# Patient Record
Sex: MALE | Race: WHITE | Employment: FULL TIME | URBAN - METROPOLITAN AREA
[De-identification: names, ages, dates, MRNs, and addresses within clinical notes are randomized per-mention and may not be internally consistent; named-entity substitution may affect disease eponyms.]

---

## 2022-07-24 NOTE — PROGRESS NOTES
HISTORY OF PRESENT ILLNESS  Lissa Zurita is a 59 y.o. male is a new patient . 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 Additionally he is had loss of energy and weight gain. He does have occasional urgency to get the bathroom in time. His PSA has remained low. His thyroid level has been recorded as normal   He reports problems with erectile dysfunction for several month. He reports of not being able to  maintain an erection most of the time. He never tried cialis or Viagra, or testosterone injections  He is on a fluid pill, every other day,has urgency,incomplete emptying( rates at 3) but is not voiding much. He reports that if he is not on a fluid pill he voids 5-6 per day. 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 His IPSS is 9,    He is on CpAP mashine, no history of DM, elevated BMI 48.82           Past Medical History:  PMHx (including negatives):  has a past medical history of Cancer ([address] Hypertension, Morbid obesity ([address] Other ill-defined conditions(799.89), Unspecified adverse effect of anesthesia, and Unspecified sleep apnea. PSurgHx:  has a past surgical history that includes hx orthopaedic (01/01/1987); hx appendectomy (01/01/1989); hx heent; hx urological; and hx colonoscopy. PSocHx:  reports that he has quit smoking. His smoking use included cigarettes. He has never used smokeless tobacco. He reports current alcohol use of about 15.0 standard drinks per week. He reports that he does not currently use drugs after having used the following drugs: Marijuana.        Acute Diagnoses Addressed Today       Benign prostatic hyperplasia with lower urinary tract symptoms, symptom details unspecified    -  Primary        Relevant Medications        testosterone (ANDROGEL) 20.25 mg/1.25 gram (1.62 %) gel        Other Relevant Orders        AMB POC PVR, ROBYN,POST-VOID RES,US,NON-IMAGING    Erectile dysfunction, unspecified erectile dysfunction type            Relevant Medications        testosterone (ANDROGEL) 20.25 mg/1.25 gram (1.62 %) gel        Other Relevant Orders        AMB POC PVR, ROBYN,POST-VOID RES,US,NON-IMAGING    Prostatitis, unspecified prostatitis type                Review of Systems   Constitutional:  Positive for malaise/fatigue. HENT: Negative. Eyes: Negative. Respiratory: On CPAP    Cardiovascular:  Positive for leg swelling. Genitourinary: Negative. Musculoskeletal: Negative. Skin: Negative. Neurological: Negative. Psychiatric/Behavioral: Negative. Patient denies the symptoms of COVID-19 per routine screening guidelines. Home Medications    Medication Sig Start Date End Date Taking? Authorizing Provider   allopurinoL (ZYLOPRIM) 100 mg tablet Take  by mouth daily. Yes Provider, Historical   escitalopram oxalate (LEXAPRO) 20 mg tablet Take 20 mg by mouth in the morning. Yes Provider, Historical   buPROPion XL (WELLBUTRIN XL) 300 mg XL tablet Take 300 mg by mouth in the morning. Yes Provider, Historical   multivitamin (ONE A DAY) tablet Take 1 Tablet by mouth in the morning. Yes Provider, Historical   tadalafiL (CIALIS) 20 mg tablet Take 1 Tablet by mouth as needed for Erectile Dysfunction. 7/25/22  Yes Abby Lerma NP   ciprofloxacin HCl (CIPRO) 500 mg tablet Take 1 Tablet by mouth two (2) times a day for 30 days. 7/25/22 8/24/22 Yes Abby Lerma NP   testosterone (ANDROGEL) 20.25 mg/1.25 gram (1.62 %) gel Apply 1 pump daily  on your upper arm. Rotate sites 7/25/22  Yes Abby Lerma NP   colchicine 0.5 mg Tab Take  by mouth. Yes Provider, Historical   indomethacin (INDOCIN) 25 mg capsule Take 25 mg by mouth three (3) times daily. Yes Provider, Historical   torsemide (DEMADEX) 10 mg tablet Take 10 mg by mouth daily.    Yes Provider, Historical oxyCODONE-acetaminophen (PERCOCET) 5-325 mg per tablet Take 1 Tab by mouth every four (4) hours as needed for Pain. 6/7/13 7/25/22  Jj Baptiste MD   promethazine (PHENERGAN) 25 mg tablet Take 1 Tab by mouth every six (6) hours as needed for Nausea. 6/7/13 7/25/22  Jj Baptiste MD   aspirin (ASPIRIN) 325 mg tablet Take 1 Tab by mouth daily. 6/7/13 7/25/22  Jj Baptiste MD   omega-3 fatty acids-vitamin e 1,000 mg cap Take 1 Cap by mouth daily. 7/25/22  Provider, Historical   PV W-O PEE/FERROUS FUMARATE/FA (M-VIT PO) Take  by mouth daily. 7/25/22  Provider, Historical      Physical Exam  Vitals and nursing note reviewed. Constitutional:       Appearance: Normal appearance. He is obese. HENT:      Nose: Nose normal.      Mouth/Throat:      Mouth: Mucous membranes are moist.   Eyes:      Pupils: Pupils are equal, round, and reactive to light. Cardiovascular:      Rate and Rhythm: Normal rate and regular rhythm. Pulmonary:      Effort: Pulmonary effort is normal.   Abdominal:      General: Abdomen is flat. Palpations: Abdomen is soft. Genitourinary:     Penis: Normal.       Testes: Normal.      Comments: Prostate small but tender in the middle  Musculoskeletal:         General: Normal range of motion. Cervical back: Normal range of motion. Skin:     General: Skin is warm. Neurological:      General: No focal deficit present. Mental Status: He is alert and oriented to person, place, and time. Psychiatric:         Mood and Affect: Mood normal.         Behavior: Behavior normal.         Thought Content: Thought content normal.         Judgment: Judgment normal.       ASSESSMENT and PLAN  Diagnoses and all orders for this visit:    1. Benign prostatic hyperplasia with lower urinary tract symptoms, symptom details unspecified  -     AMB POC PVR, ROBYN,POST-VOID RES,US,NON-IMAGING  -     testosterone (ANDROGEL) 20.25 mg/1.25 gram (1.62 %) gel; Apply 1 pump daily  on your upper arm. Rotate sites    2. Erectile dysfunction, unspecified erectile dysfunction type  -     AMB POC PVR, ROBYN,POST-VOID RES,US,NON-IMAGING  -     testosterone (ANDROGEL) 20.25 mg/1.25 gram (1.62 %) gel; Apply 1 pump daily  on your upper arm. Rotate sites    3. Prostatitis, unspecified prostatitis type    Other orders  -     tadalafiL (CIALIS) 20 mg tablet; Take 1 Tablet by mouth as needed for Erectile Dysfunction. -     ciprofloxacin HCl (CIPRO) 500 mg tablet; Take 1 Tablet by mouth two (2) times a day for 30 days. Discussed sids effects of ciprofloxacin and Cialis. Patient received education on prostatitis management ( warm tab, gel seat,alive as needed for pain)          Gene Younger may have a reminder for a \"due or due soon\" health maintenance. The patient has been encouraged to contact their primary care provider for follow-up on this health maintenance or other necessary and/or routine health screening.

## 2022-07-25 ENCOUNTER — OFFICE VISIT (OUTPATIENT)
Dept: UROLOGY | Age: 57
End: 2022-07-25
Payer: COMMERCIAL

## 2022-07-25 VITALS
SYSTOLIC BLOOD PRESSURE: 152 MMHG | DIASTOLIC BLOOD PRESSURE: 88 MMHG | HEART RATE: 72 BPM | HEIGHT: 73 IN | WEIGHT: 315 LBS | BODY MASS INDEX: 41.75 KG/M2

## 2022-07-25 DIAGNOSIS — N40.1 BENIGN PROSTATIC HYPERPLASIA WITH LOWER URINARY TRACT SYMPTOMS, SYMPTOM DETAILS UNSPECIFIED: Primary | ICD-10-CM

## 2022-07-25 DIAGNOSIS — N41.9 PROSTATITIS, UNSPECIFIED PROSTATITIS TYPE: ICD-10-CM

## 2022-07-25 DIAGNOSIS — E29.1 HYPOGONADISM IN MALE: ICD-10-CM

## 2022-07-25 DIAGNOSIS — N52.9 ERECTILE DYSFUNCTION, UNSPECIFIED ERECTILE DYSFUNCTION TYPE: ICD-10-CM

## 2022-07-25 PROCEDURE — 99204 OFFICE O/P NEW MOD 45 MIN: CPT | Performed by: UROLOGY

## 2022-07-25 RX ORDER — TESTOSTERONE 20.25 MG/1.25G
GEL TOPICAL
Qty: 20.25 EACH | Refills: 2 | Status: SHIPPED | OUTPATIENT
Start: 2022-07-25

## 2022-07-25 RX ORDER — CIPROFLOXACIN 500 MG/1
500 TABLET ORAL 2 TIMES DAILY
Qty: 60 TABLET | Refills: 0 | Status: SHIPPED | OUTPATIENT
Start: 2022-07-25 | End: 2022-08-24

## 2022-07-25 RX ORDER — ESCITALOPRAM OXALATE 20 MG/1
20 TABLET ORAL DAILY
COMMUNITY

## 2022-07-25 RX ORDER — BISMUTH SUBSALICYLATE 262 MG
1 TABLET,CHEWABLE ORAL DAILY
COMMUNITY

## 2022-07-25 RX ORDER — TADALAFIL 20 MG/1
20 TABLET ORAL AS NEEDED
Qty: 30 TABLET | Refills: 1 | Status: SHIPPED | OUTPATIENT
Start: 2022-07-25

## 2022-07-25 RX ORDER — ALLOPURINOL 100 MG/1
TABLET ORAL DAILY
COMMUNITY

## 2022-07-25 RX ORDER — BUPROPION HYDROCHLORIDE 300 MG/1
300 TABLET ORAL DAILY
COMMUNITY

## 2022-07-25 NOTE — PROGRESS NOTES
Chief Complaint   Patient presents with    New Patient    Benign Prostatic Hypertrophy    Hypogonadism         1. Have you been to the ER, urgent care clinic since your last visit? Hospitalized since your last visit? Yes [facility]     2. Have you seen or consulted any other health care providers outside of the [address] [place] [address] since your last visit? Include any pap smears or colon screening.  Yes Where: pt dosent remember where      Visit Vitals  BP (!) 152/88 (BP 1 Location: Right upper arm, BP Patient Position: Sitting, BP Cuff Size: Large adult)   Pulse 72   Ht 6' 1\" (1.854 m)   Wt (!) 370 lb (167.8 kg)   BMI 48.82 kg/m²

## 2022-07-26 ENCOUNTER — TELEPHONE (OUTPATIENT)
Dept: UROLOGY | Age: 57
End: 2022-07-26

## 2022-07-26 NOTE — TELEPHONE ENCOUNTER
Patient called stating [facility] pharmacy says his CIRPO and Testosterone needs prior auth    Pls advise, he would like a call back

## 2022-07-27 NOTE — TELEPHONE ENCOUNTER
Please call patient back and let him know your plan below. He was suppose to get call back yesterday but was not called back. In note from 7/26 it says would work on 7/27.  Please call patient today to give him update

## 2022-07-28 NOTE — TELEPHONE ENCOUNTER
I spoke with patient, found labs to support prescription prior auth thru covermymeds. Im just awaiting determination and then I will followup with patient.

## 2022-07-28 NOTE — TELEPHONE ENCOUNTER
I spoke to wife and let her know that [facility] or Annelise Kate needs to prescribe it to [facility] because they can no longer use 30 day free trial.  I told her was completing a prior auth per insurance and I would followup with her once I receive a response.

## 2022-07-29 NOTE — TELEPHONE ENCOUNTER
I went out on karen my meds and seen PA was denied Im looking for fax to highlight why so that you can tell me how you want to proceed.

## 2022-07-29 NOTE — TELEPHONE ENCOUNTER
Prior Auth denied, I left voice message notifying patient. Please tell me how you want to proceed, what are you going to do next? And then let me know so I can update the patient.

## 2022-08-01 DIAGNOSIS — E29.1 HYPOGONADISM IN MALE: Primary | ICD-10-CM

## 2022-08-05 NOTE — TELEPHONE ENCOUNTER
Pt called stating that there is an issue with his prescription. Called pt back, hannah Darby is working on it but she is out of the office.   LVM to call the office back

## 2022-08-08 ENCOUNTER — TELEPHONE (OUTPATIENT)
Dept: UROLOGY | Age: 57
End: 2022-08-08

## 2022-08-08 NOTE — TELEPHONE ENCOUNTER
Spoke to [place], labs were ordered, once patient completes required labs then script can be re-submitted through cover my meds or phone call.

## 2022-08-08 NOTE — TELEPHONE ENCOUNTER
Pt called LVM stating he had questions about lab test he has to take, I called pt back and he said he has already started taking the androgel and is worried that his lab test will be inaccurate. I spoke with Asif Galo and she said that is fine, go ahead and get the lab work done to give us a baseline. Pt understood.

## 2022-08-09 NOTE — TELEPHONE ENCOUNTER
Spoke to patient, he completed labs this morning at [address] [place] [address]. As soon as results hit, I will continue with appeal process.

## 2022-08-12 NOTE — TELEPHONE ENCOUNTER
I checked [employer], preliminary results are in, ill complete Monday 8/15/22 when final results are in.

## 2022-08-13 LAB
PSA FREE MFR SERPL: 32.5 %
PSA FREE SERPL-MCNC: 0.13 NG/ML
PSA SERPL-MCNC: 0.4 NG/ML (ref 0–4)
T4 FREE SERPL-MCNC: 1.37 NG/DL (ref 0.82–1.77)
TESTOST FREE SERPL-MCNC: 6.3 PG/ML (ref 7.2–24)
TESTOST SERPL-MCNC: 388 NG/DL (ref 264–916)
TSH SERPL DL<=0.005 MIU/L-ACNC: 2.14 UIU/ML (ref 0.45–4.5)

## 2022-09-12 NOTE — PROGRESS NOTES
HISTORY OF PRESENT ILLNESS      The patient is a 58 yo male is here for follow up appointment on his prostatitis and ED. He had been treated with cipro and prescribed  Cialis and testosterone gel  Labs  from 8/9/22  Testosterone 388  Free testosterone 6.3    PSA=0.4  Reports using testosterone gel,. He reports having more energy. He wants to increase dose of testosterone to 2 pumps. Will increase the dose and recheck his levels in 3 months         Past Medical History:  PMHx (including negatives):  has a past medical history of Cancer ([address] Hypertension, Morbid obesity ([address] Other ill-defined conditions(799.89), Unspecified adverse effect of anesthesia, and Unspecified sleep apnea. PSurgHx:  has a past surgical history that includes hx orthopaedic (01/01/1987); hx appendectomy (01/01/1989); hx heent; hx urological; and hx colonoscopy. PSocHx:  reports that he has quit smoking. His smoking use included cigarettes. He has never used smokeless tobacco. He reports current alcohol use of about 15.0 standard drinks per week. He reports that he does not currently use drugs after having used the following drugs: Marijuana. Home Medications    Medication Sig Start Date End Date Taking? Authorizing Provider   allopurinoL (ZYLOPRIM) 100 mg tablet Take  by mouth daily. Provider, Historical   escitalopram oxalate (LEXAPRO) 20 mg tablet Take 20 mg by mouth in the morning. Provider, Historical   buPROPion XL (WELLBUTRIN XL) 300 mg XL tablet Take 300 mg by mouth in the morning. Provider, Historical   multivitamin (ONE A DAY) tablet Take 1 Tablet by mouth in the morning. Provider, Historical   tadalafiL (CIALIS) 20 mg tablet Take 1 Tablet by mouth as needed for Erectile Dysfunction. 7/25/22   Kiara Lerma NP   testosterone (ANDROGEL) 20.25 mg/1.25 gram (1.62 %) gel Apply 1 pump daily  on your upper arm. Rotate sites 7/25/22   Major Vázquez NP   colchicine 0.5 mg Tab Take  by mouth. Provider, Historical   indomethacin (INDOCIN) 25 mg capsule Take 25 mg by mouth three (3) times daily. Provider, Historical   torsemide (DEMADEX) 10 mg tablet Take 10 mg by mouth daily. Provider, Historical        Chronic Conditions Addressed Today       1. Prostatitis - Primary     Overview      8/17/22 treated with cipro  has urgency,incomplete emptying( rates at 3) but is not voiding much. He reports that if he is not on a fluid pill he voids 5-6 per day. 2. Hypogonadism in male     Overview      Labs  from 8/9/22  Testosterone 388  Free testosterone 6.3    PSA=0.4  Labs from 5/2022  Testosterone total=210  PSA=0.4  - he is had loss of energy and weight gain. He does have occasional urgency to get the bathroom in time. His PSA has remained low. His thyroid level has been recorded as normal   He reports problems with erectile dysfunction for several month. He reports of not being able to  maintain an erection most of the time. He never tried cialis or Viagra, or testosterone injections            Review of Systems   Constitutional: Negative. HENT: Negative. Eyes: Negative. Respiratory: Negative. Cardiovascular: Negative. Gastrointestinal: Negative. Genitourinary: Negative. Musculoskeletal: Negative. Skin: Negative. Patient denies the symptoms of COVID-19 per routine screening guidelines. Physical Exam  Vitals and nursing note reviewed. Constitutional:       Appearance: Normal appearance. He is obese. HENT:      Nose: Nose normal.      Mouth/Throat:      Mouth: Mucous membranes are moist.   Eyes:      Pupils: Pupils are equal, round, and reactive to light. Cardiovascular:      Rate and Rhythm: Normal rate and regular rhythm. Pulmonary:      Effort: Pulmonary effort is normal.   Abdominal:      General: Abdomen is flat. Palpations: Abdomen is soft.    Genitourinary:     Penis: Normal.       Testes: Normal. Musculoskeletal:         General: Normal range of motion. Cervical back: Normal range of motion. Skin:     General: Skin is warm. Neurological:      General: No focal deficit present. Mental Status: He is alert and oriented to person, place, and time. Psychiatric:         Mood and Affect: Mood normal.         Behavior: Behavior normal.         Thought Content: Thought content normal.         Judgment: Judgment normal.      ASSESSMENT and PLAN  Diagnoses and all orders for this visit:    1. Prostatitis, unspecified prostatitis type    2. Hypogonadism in male               Ronan Rome may have a reminder for a \"due or due soon\" health maintenance. The patient has been encouraged to contact their primary care provider for follow-up on this health maintenance or other necessary and/or routine health screening.

## 2022-09-14 ENCOUNTER — OFFICE VISIT (OUTPATIENT)
Dept: UROLOGY | Age: 57
End: 2022-09-14
Payer: COMMERCIAL

## 2022-09-14 VITALS
HEART RATE: 78 BPM | TEMPERATURE: 97.8 F | OXYGEN SATURATION: 100 % | DIASTOLIC BLOOD PRESSURE: 96 MMHG | BODY MASS INDEX: 41.75 KG/M2 | HEIGHT: 73 IN | WEIGHT: 315 LBS | SYSTOLIC BLOOD PRESSURE: 153 MMHG

## 2022-09-14 DIAGNOSIS — E29.1 HYPOGONADISM IN MALE: ICD-10-CM

## 2022-09-14 DIAGNOSIS — N41.9 PROSTATITIS, UNSPECIFIED PROSTATITIS TYPE: Primary | ICD-10-CM

## 2022-09-14 LAB
BILIRUB UR QL: NEGATIVE
GLUCOSE UR-MCNC: NEGATIVE MG/DL
KETONES P FAST UR STRIP-MCNC: NEGATIVE MG/DL
PH UR STRIP: 7.5 [PH] (ref 4.6–8)
PROT UR QL STRIP: NEGATIVE
SP GR UR STRIP: 1.01 (ref 1–1.03)
UA UROBILINOGEN AMB POC: NORMAL (ref 0.2–1)
URINALYSIS CLARITY POC: CLEAR
URINALYSIS COLOR POC: YELLOW
URINE BLOOD POC: NEGATIVE
URINE LEUKOCYTES POC: NEGATIVE
URINE NITRITES POC: NEGATIVE

## 2022-09-14 PROCEDURE — 81003 URINALYSIS AUTO W/O SCOPE: CPT | Performed by: UROLOGY

## 2022-09-14 PROCEDURE — 99214 OFFICE O/P EST MOD 30 MIN: CPT | Performed by: UROLOGY

## 2022-09-14 RX ORDER — TESTOSTERONE 20.25 MG/1.25G
GEL TOPICAL
Qty: 75 EACH | Refills: 3 | Status: SHIPPED | OUTPATIENT
Start: 2022-09-14

## 2022-09-14 NOTE — PROGRESS NOTES
Chief Complaint   Patient presents with    Follow-up    Benign Prostatic Hypertrophy    Prostatitis    Erectile Dysfunction     Ros ipss sky        PHQ-9 score is    Negative    Vitals:    09/14/22 0916   BP: (!) 153/96   Pulse: 78   Temp: 97.8 °F (36.6 °C)   TempSrc: Temporal   SpO2: 100%   Weight: (!) 370 lb (167.8 kg)   Height: 6' 1\" (1.854 m)   PainSc:   0 - No pain        1. \"Have you been to the ER, urgent care clinic since your last visit? Hospitalized since your last visit? \" No    2. \"Have you seen or consulted any other health care providers outside of the [address] [place] [address] since your last visit? \" No     3. For patients aged 39-70: Has the patient had a colonoscopy / FIT/ Cologuard? No      If the patient is female:    4. For patients aged 41-77: Has the patient had a mammogram within the past 2 years? No      5. For patients aged 21-65: Has the patient had a pap smear?  No

## 2022-10-06 LAB
TESTOST FREE SERPL-MCNC: 8.4 PG/ML (ref 7.2–24)
TESTOST SERPL-MCNC: 341 NG/DL (ref 264–916)

## 2022-10-19 ENCOUNTER — TELEPHONE (OUTPATIENT)
Dept: UROLOGY | Age: 57
End: 2022-10-19

## 2022-10-19 NOTE — TELEPHONE ENCOUNTER
My findings and recommendations are based on the patient's symptoms, exam, diagnostic testing and records. Pt received a lab order int he mail and wanted to know if he needed to get more labs done before his appt on 12/14 because he just had some labs done a couple weeks ago

## 2022-11-22 ENCOUNTER — TELEPHONE (OUTPATIENT)
Dept: UROLOGY | Age: 57
End: 2022-11-22

## 2022-11-22 NOTE — TELEPHONE ENCOUNTER
Called the patient and left him a message.  Rx will be ready for  on Monday, Nov,28th at [address]

## 2022-11-22 NOTE — TELEPHONE ENCOUNTER
Patient requesting refill for testosterone for [facility] pharmacy in [facility], due to dosage change.  Pharmacist Tisha says it is on hold and just needs the ok to fill it please advise  And call him back     [phone]

## 2022-11-23 ENCOUNTER — TELEPHONE (OUTPATIENT)
Dept: UROLOGY | Age: 57
End: 2022-11-23

## 2022-11-25 NOTE — TELEPHONE ENCOUNTER
PT STATED DR Faustino Gary SAID HE WAS UPPING TO 2 PUMPS OF TESTOSTERONE GEL PT RAN OUT AND NEEDS REFILL, NEEDS TO GO TO [facility] IN [facility]   PLEASE ADVISE

## 2022-11-28 DIAGNOSIS — E29.1 HYPOGONADISM IN MALE: Primary | ICD-10-CM

## 2022-11-28 RX ORDER — TESTOSTERONE 20.25 MG/1.25G
GEL TOPICAL
Qty: 20.25 EACH | Refills: 3 | Status: SHIPPED | OUTPATIENT
Start: 2022-11-28

## 2022-11-28 NOTE — TELEPHONE ENCOUNTER
Pt called again and stated it should be 2 pumps not one., there is multiple pt cases open about this, and I confirmed with Neisha Sofia she said she spoek with the pharmasit and told them 1 pump but if he wants 2 she will change it to 2. I let pt know.  Can you please send in new perscription AND call pharmacy so they know you are confirming the change

## 2023-01-01 DIAGNOSIS — E29.1 HYPOGONADISM IN MALE: Primary | ICD-10-CM

## 2023-01-01 NOTE — PROGRESS NOTES
HISTORY OF PRESENT ILLNESS    Zoila Reyse is a 59 y.o. male is here for follow up on ED. He is on testosterone gel the combination appears to be working. He is able to maintain the erection and have good sex. He denies fevers chills flank pain any side effects from the Cialis including back pain or any side effects from testosterone including abscesses etc.    Labs  from 8/9/22  Testosterone 388  Free testosterone 6.3    PSA=0.4  Labs from 5/2022  Testosterone total=210  PSA=0.4    IPSS  Score: 4    Past Medical History:  PMHx (including negatives):  has a past medical history of Cancer ([address] Hypertension, Morbid obesity ([address] Other ill-defined conditions(799.89), Unspecified adverse effect of anesthesia, and Unspecified sleep apnea. PSurgHx:  has a past surgical history that includes hx orthopaedic (01/01/1987); hx appendectomy (01/01/1989); hx heent; hx urological; and hx colonoscopy. PSocHx:  reports that he has quit smoking. His smoking use included cigarettes. He has never used smokeless tobacco. He reports current alcohol use of about 15.0 standard drinks per week. He reports that he does not currently use drugs after having used the following drugs: Marijuana. Home Medications    Medication Sig Start Date End Date Taking? Authorizing Provider   tadalafiL (CIALIS) 20 mg tablet Take 1 Tablet by mouth as needed for Erectile Dysfunction. 1/4/23  Yes Carrie Lerma NP   testosterone (ANDROGEL) 20.25 mg/1.25 gram (1.62 %) gel Apply 20 mg to affected area daily. Max Daily Amount: 20 mg.  Apply 2 pumps once a day on upper arm, rotate the site 1/4/23  Yes Carrie Lerma NP   testosterone (ANDROGEL) 20.25 mg/1.25 gram (1.62 %) gel Apply 2 pumps once daily on upper arm, rotate sites 11/28/22   Gerard Jimenes NP   testosterone (ANDROGEL) 20.25 mg/1.25 gram (1.62 %) gel Apply two pumps once a day 9/14/22   Boguslavsky, Carrie Snide, NP   allopurinoL (ZYLOPRIM) 100 mg tablet Take  by mouth daily.    Provider, Historical   escitalopram oxalate (LEXAPRO) 20 mg tablet Take 20 mg by mouth in the morning. Provider, Historical   buPROPion XL (WELLBUTRIN XL) 300 mg XL tablet Take 300 mg by mouth in the morning. Provider, Historical   multivitamin (ONE A DAY) tablet Take 1 Tablet by mouth in the morning. Provider, Historical   tadalafiL (CIALIS) 20 mg tablet Take 1 Tablet by mouth as needed for Erectile Dysfunction. 7/25/22   Yobany Lerma, IRMA   testosterone (ANDROGEL) 20.25 mg/1.25 gram (1.62 %) gel Apply 1 pump daily  on your upper arm. Rotate sites 7/25/22   Shannon Braden, IRMA   colchicine 0.5 mg Tab Take  by mouth. Provider, Historical   indomethacin (INDOCIN) 25 mg capsule Take 25 mg by mouth three (3) times daily. Provider, Historical   torsemide (DEMADEX) 10 mg tablet Take 10 mg by mouth daily. Provider, Historical        Chronic Conditions Addressed Today       1. Erectile dysfunction    2. Prostatitis     Overview      8/17/22 treated with cipro  has urgency,incomplete emptying( rates at 3) but is not voiding much. He reports that if he is not on a fluid pill he voids 5-6 per day. Relevant Medications     testosterone (ANDROGEL) 20.25 mg/1.25 gram (1.62 %) gel     Other Relevant Orders     AMB POC PVR, ROBYN,POST-VOID RES,US,NON-IMAGING (Completed)     AMB POC URINALYSIS DIP STICK AUTO W/O MICRO (Completed)    3. Hypogonadism in male - Primary     Overview      Labs  from 8/9/22  Testosterone 388  Free testosterone 6.3    PSA=0.4  Labs from 5/2022  Testosterone total=210  PSA=0.4  - he is had loss of energy and weight gain. He does have occasional urgency to get the bathroom in time. His PSA has remained low. His thyroid level has been recorded as normal   He reports problems with erectile dysfunction for several month. He reports of not being able to  maintain an erection most of the time.    He never tried cialis or Viagra, or testosterone injections          Relevant Medications     tadalafiL (CIALIS) 20 mg tablet     testosterone (ANDROGEL) 20.25 mg/1.25 gram (1.62 %) gel     Other Relevant Orders     AMB POC PVR, ROBYN,POST-VOID RES,US,NON-IMAGING (Completed)     AMB POC URINALYSIS DIP STICK AUTO W/O MICRO (Completed)       Review of Systems   Constitutional: Negative. HENT:  Positive for congestion and sore throat. Eyes: Negative. Respiratory: Negative. Cardiovascular: Negative. Gastrointestinal: Negative. Musculoskeletal:  Positive for joint pain. Skin: Negative. Neurological: Negative. Endo/Heme/Allergies: Negative. Psychiatric/Behavioral: Negative. Patient denies the symptoms of COVID-19 per routine screening guidelines. Physical Exam  HENT:      Head: Normocephalic. Nose: Nose normal.   Eyes:      Pupils: Pupils are equal, round, and reactive to light. Abdominal:      Palpations: Abdomen is soft. Genitourinary:     Comments: deferred  Musculoskeletal:      Cervical back: Neck supple. Skin:     General: Skin is warm. Capillary Refill: Capillary refill takes less than 2 seconds. Neurological:      General: No focal deficit present. Mental Status: He is alert. Psychiatric:         Mood and Affect: Mood normal.       ASSESSMENT and PLAN  Diagnoses and all orders for this visit:    1. Hypogonadism in male  -     AMB POC PVR, ROBYN,POST-VOID RES,US,NON-IMAGING  -     AMB POC URINALYSIS DIP STICK AUTO W/O MICRO  -     testosterone (ANDROGEL) 20.25 mg/1.25 gram (1.62 %) gel; Apply 20 mg to affected area daily. Max Daily Amount: 20 mg. Apply 2 pumps once a day on upper arm, rotate the site    2. Prostatitis, unspecified prostatitis type  -     AMB POC PVR, ROBYN,POST-VOID RES,US,NON-IMAGING  -     AMB POC URINALYSIS DIP STICK AUTO W/O MICRO  -     testosterone (ANDROGEL) 20.25 mg/1.25 gram (1.62 %) gel; Apply 20 mg to affected area daily. Max Daily Amount: 20 mg.  Apply 2 pumps once a day on upper arm, rotate the site    3. Erectile dysfunction, unspecified erectile dysfunction type    Other orders  -     tadalafiL (CIALIS) 20 mg tablet; Take 1 Tablet by mouth as needed for Erectile Dysfunction. Santa Holt may have a reminder for a \"due or due soon\" health maintenance. The patient has been encouraged to contact their primary care provider for follow-up on this health maintenance or other necessary and/or routine health screening.      Sabiha Vasquez MD

## 2023-01-04 ENCOUNTER — OFFICE VISIT (OUTPATIENT)
Dept: UROLOGY | Age: 58
End: 2023-01-04
Payer: COMMERCIAL

## 2023-01-04 VITALS
OXYGEN SATURATION: 99 % | HEIGHT: 73 IN | SYSTOLIC BLOOD PRESSURE: 148 MMHG | TEMPERATURE: 97.7 F | BODY MASS INDEX: 41.75 KG/M2 | HEART RATE: 60 BPM | RESPIRATION RATE: 16 BRPM | DIASTOLIC BLOOD PRESSURE: 88 MMHG | WEIGHT: 315 LBS

## 2023-01-04 DIAGNOSIS — E29.1 HYPOGONADISM IN MALE: Primary | ICD-10-CM

## 2023-01-04 DIAGNOSIS — N52.9 ERECTILE DYSFUNCTION, UNSPECIFIED ERECTILE DYSFUNCTION TYPE: ICD-10-CM

## 2023-01-04 DIAGNOSIS — N41.9 PROSTATITIS, UNSPECIFIED PROSTATITIS TYPE: ICD-10-CM

## 2023-01-04 LAB
BILIRUB UR QL: NEGATIVE
GLUCOSE UR-MCNC: NEGATIVE MG/DL
KETONES P FAST UR STRIP-MCNC: NEGATIVE MG/DL
PH UR STRIP: 5.5 [PH] (ref 4.6–8)
PROT UR QL STRIP: NEGATIVE
PVR POC: NORMAL CC
SP GR UR STRIP: 1.03 (ref 1–1.03)
UA UROBILINOGEN AMB POC: NORMAL (ref 0.2–1)
URINALYSIS CLARITY POC: CLEAR
URINALYSIS COLOR POC: YELLOW
URINE BLOOD POC: NEGATIVE
URINE LEUKOCYTES POC: NEGATIVE
URINE NITRITES POC: NEGATIVE

## 2023-01-04 PROCEDURE — 51798 US URINE CAPACITY MEASURE: CPT | Performed by: UROLOGY

## 2023-01-04 PROCEDURE — 81003 URINALYSIS AUTO W/O SCOPE: CPT | Performed by: UROLOGY

## 2023-01-04 PROCEDURE — 99214 OFFICE O/P EST MOD 30 MIN: CPT | Performed by: UROLOGY

## 2023-01-04 RX ORDER — TESTOSTERONE 20.25 MG/1.25G
20.25 GEL TOPICAL DAILY
Qty: 75 EACH | Refills: 5 | Status: SHIPPED | OUTPATIENT
Start: 2023-01-04

## 2023-01-04 RX ORDER — TADALAFIL 20 MG/1
20 TABLET ORAL AS NEEDED
Qty: 30 TABLET | Refills: 3 | Status: SHIPPED | OUTPATIENT
Start: 2023-01-04

## 2023-01-04 NOTE — PROGRESS NOTES
Chief Complaint   Patient presents with    Follow-up    Hypogonadism    Prostatitis       PHQ-9 score is    Negative    Vitals:    01/04/23 1615   BP: (!) 148/88   Pulse: 60   Resp: 16   Temp: 97.7 °F (36.5 °C)   SpO2: 99%   Weight: (!) 370 lb (167.8 kg)   Height: 6' 1\" (1.854 m)        1. \"Have you been to the ER, urgent care clinic since your last visit? Hospitalized since your last visit? \" No    2. \"Have you seen or consulted any other health care providers outside of the [address] [place] [address] since your last visit? \" No     3. For patients aged 39-70: Has the patient had a colonoscopy / FIT/ Cologuard? No      If the patient is female:    4. For patients aged 41-77: Has the patient had a mammogram within the past 2 years? NA - based on age or sex      11. For patients aged 21-65: Has the patient had a pap smear?  NA - based on age or sex

## 2023-01-16 ENCOUNTER — TELEPHONE (OUTPATIENT)
Dept: UROLOGY | Age: 58
End: 2023-01-16

## 2023-01-16 NOTE — TELEPHONE ENCOUNTER
PT IS HAVING ISSUES W THE PHARMACY DUE TO THE RX THAT WAS WRITTEN BECAUSE OF THE DIRECTIONS ON THE RX SO THEY ARE UNABLE TO FILL IT     PHARMACY NEEDS [address] OR SOMEONE TO CALL AND CLEAR UP THE INSTRUCTIONS       PHARMACY IS [facility] [address] [place] [address]     [phone]

## 2023-01-17 NOTE — TELEPHONE ENCOUNTER
Patient can not  his cialis because it was not time for refill. He took his last rx on 1/4/23. I spoke to pharmacy,the directions on cilias and androgel are all correct.

## 2023-07-05 ENCOUNTER — OFFICE VISIT (OUTPATIENT)
Age: 58
End: 2023-07-05
Payer: COMMERCIAL

## 2023-07-05 VITALS
BODY MASS INDEX: 41.75 KG/M2 | DIASTOLIC BLOOD PRESSURE: 98 MMHG | HEIGHT: 73 IN | WEIGHT: 315 LBS | SYSTOLIC BLOOD PRESSURE: 160 MMHG | HEART RATE: 72 BPM | OXYGEN SATURATION: 98 % | RESPIRATION RATE: 16 BRPM

## 2023-07-05 DIAGNOSIS — N52.9 ERECTILE DYSFUNCTION, UNSPECIFIED ERECTILE DYSFUNCTION TYPE: ICD-10-CM

## 2023-07-05 DIAGNOSIS — E29.1 HYPOGONADISM IN MALE: ICD-10-CM

## 2023-07-05 DIAGNOSIS — R79.89 LOW TESTOSTERONE IN MALE: ICD-10-CM

## 2023-07-05 DIAGNOSIS — N41.9 PROSTATITIS, UNSPECIFIED PROSTATITIS TYPE: Primary | ICD-10-CM

## 2023-07-05 DIAGNOSIS — N41.9 PROSTATITIS, UNSPECIFIED PROSTATITIS TYPE: ICD-10-CM

## 2023-07-05 PROBLEM — E66.01 OBESITIES, MORBID (HCC): Status: ACTIVE | Noted: 2023-07-05

## 2023-07-05 PROBLEM — M10.9 GOUT: Status: ACTIVE | Noted: 2023-07-05

## 2023-07-05 PROCEDURE — 99214 OFFICE O/P EST MOD 30 MIN: CPT | Performed by: UROLOGY

## 2023-07-05 RX ORDER — TESTOSTERONE 20.25 MG/1.25G
GEL TOPICAL
Qty: 75 G | Refills: 0 | Status: SHIPPED | OUTPATIENT
Start: 2023-07-05

## 2023-07-05 RX ORDER — MELOXICAM 15 MG/1
TABLET ORAL
COMMUNITY
Start: 2023-06-12

## 2023-07-05 RX ORDER — METHOCARBAMOL 500 MG/1
TABLET, FILM COATED ORAL
COMMUNITY
Start: 2023-04-24

## 2023-07-05 RX ORDER — TADALAFIL 20 MG/1
20 TABLET ORAL PRN
Qty: 30 TABLET | Refills: 3 | Status: SHIPPED | OUTPATIENT
Start: 2023-07-05 | End: 2024-07-04

## 2023-07-08 LAB
BASOPHILS # BLD AUTO: 0.1 X10E3/UL (ref 0–0.2)
BASOPHILS NFR BLD AUTO: 1 %
EOSINOPHIL # BLD AUTO: 0.2 X10E3/UL (ref 0–0.4)
EOSINOPHIL NFR BLD AUTO: 2 %
ERYTHROCYTE [DISTWIDTH] IN BLOOD BY AUTOMATED COUNT: 13.2 % (ref 11.6–15.4)
HCT VFR BLD AUTO: 45.1 % (ref 37.5–51)
HGB BLD-MCNC: 15.5 G/DL (ref 13–17.7)
IMM GRANULOCYTES # BLD AUTO: 0 X10E3/UL (ref 0–0.1)
IMM GRANULOCYTES NFR BLD AUTO: 0 %
LYMPHOCYTES # BLD AUTO: 1.5 X10E3/UL (ref 0.7–3.1)
LYMPHOCYTES NFR BLD AUTO: 20 %
MCH RBC QN AUTO: 30.1 PG (ref 26.6–33)
MCHC RBC AUTO-ENTMCNC: 34.4 G/DL (ref 31.5–35.7)
MCV RBC AUTO: 88 FL (ref 79–97)
MONOCYTES # BLD AUTO: 0.6 X10E3/UL (ref 0.1–0.9)
MONOCYTES NFR BLD AUTO: 9 %
NEUTROPHILS # BLD AUTO: 4.8 X10E3/UL (ref 1.4–7)
NEUTROPHILS NFR BLD AUTO: 68 %
PLATELET # BLD AUTO: 179 X10E3/UL (ref 150–450)
PSA FREE MFR SERPL: 21.7 %
PSA FREE SERPL-MCNC: 0.13 NG/ML
PSA SERPL-MCNC: 0.6 NG/ML (ref 0–4)
RBC # BLD AUTO: 5.15 X10E6/UL (ref 4.14–5.8)
WBC # BLD AUTO: 7.2 X10E3/UL (ref 3.4–10.8)

## 2023-07-11 LAB
TESTOST FREE SERPL-MCNC: 4.7 PG/ML (ref 6.6–18.1)
TESTOST SERPL-MCNC: 360 NG/DL (ref 264–916)

## 2024-01-30 ENCOUNTER — TELEPHONE (OUTPATIENT)
Age: 59
End: 2024-01-30

## 2024-02-01 DIAGNOSIS — N52.01 ERECTILE DYSFUNCTION DUE TO ARTERIAL INSUFFICIENCY: ICD-10-CM

## 2024-02-01 DIAGNOSIS — R97.20 ELEVATED PSA: ICD-10-CM

## 2024-02-01 DIAGNOSIS — N41.9 INFLAMMATORY DISEASE OF PROSTATE, UNSPECIFIED: ICD-10-CM

## 2024-02-01 DIAGNOSIS — E29.1 HYPOGONADISM IN MALE: ICD-10-CM

## 2024-02-01 DIAGNOSIS — E29.1 TESTICULAR HYPOFUNCTION: ICD-10-CM

## 2024-02-01 NOTE — TELEPHONE ENCOUNTER
Patient called this morning, complaining the Lab didn't have orders.    I reentered all three orders and sent them

## 2024-02-02 LAB
BASOPHILS # BLD AUTO: 0.1 X10E3/UL (ref 0–0.2)
BASOPHILS NFR BLD AUTO: 1 %
EOSINOPHIL # BLD AUTO: 0.1 X10E3/UL (ref 0–0.4)
EOSINOPHIL NFR BLD AUTO: 1 %
ERYTHROCYTE [DISTWIDTH] IN BLOOD BY AUTOMATED COUNT: 12.9 % (ref 11.6–15.4)
HCT VFR BLD AUTO: 48.8 % (ref 37.5–51)
HGB BLD-MCNC: 16.3 G/DL (ref 13–17.7)
IMM GRANULOCYTES # BLD AUTO: 0 X10E3/UL (ref 0–0.1)
IMM GRANULOCYTES NFR BLD AUTO: 0 %
LYMPHOCYTES # BLD AUTO: 2 X10E3/UL (ref 0.7–3.1)
LYMPHOCYTES NFR BLD AUTO: 25 %
MCH RBC QN AUTO: 30.5 PG (ref 26.6–33)
MCHC RBC AUTO-ENTMCNC: 33.4 G/DL (ref 31.5–35.7)
MCV RBC AUTO: 91 FL (ref 79–97)
MONOCYTES # BLD AUTO: 0.7 X10E3/UL (ref 0.1–0.9)
MONOCYTES NFR BLD AUTO: 9 %
NEUTROPHILS # BLD AUTO: 4.9 X10E3/UL (ref 1.4–7)
NEUTROPHILS NFR BLD AUTO: 64 %
PLATELET # BLD AUTO: 199 X10E3/UL (ref 150–450)
PSA FREE MFR SERPL: 25 %
PSA FREE SERPL-MCNC: 0.15 NG/ML
PSA SERPL-MCNC: 0.6 NG/ML (ref 0–4)
RBC # BLD AUTO: 5.34 X10E6/UL (ref 4.14–5.8)
WBC # BLD AUTO: 7.7 X10E3/UL (ref 3.4–10.8)

## 2024-02-20 ENCOUNTER — OFFICE VISIT (OUTPATIENT)
Age: 59
End: 2024-02-20
Payer: COMMERCIAL

## 2024-02-20 VITALS
WEIGHT: 305 LBS | HEART RATE: 71 BPM | SYSTOLIC BLOOD PRESSURE: 168 MMHG | BODY MASS INDEX: 40.24 KG/M2 | DIASTOLIC BLOOD PRESSURE: 94 MMHG

## 2024-02-20 DIAGNOSIS — N52.9 ERECTILE DYSFUNCTION, UNSPECIFIED ERECTILE DYSFUNCTION TYPE: ICD-10-CM

## 2024-02-20 DIAGNOSIS — R79.89 LOW TESTOSTERONE IN MALE: ICD-10-CM

## 2024-02-20 DIAGNOSIS — N40.1 BENIGN PROSTATIC HYPERPLASIA WITH LOWER URINARY TRACT SYMPTOMS, SYMPTOM DETAILS UNSPECIFIED: ICD-10-CM

## 2024-02-20 DIAGNOSIS — N41.9 PROSTATITIS, UNSPECIFIED PROSTATITIS TYPE: Primary | ICD-10-CM

## 2024-02-20 LAB
BILIRUBIN, URINE, POC: NEGATIVE
BLOOD URINE, POC: NEGATIVE
GLUCOSE URINE, POC: NEGATIVE
KETONES, URINE, POC: ABNORMAL
LEUKOCYTE ESTERASE, URINE, POC: NEGATIVE
NITRITE, URINE, POC: NEGATIVE
PH, URINE, POC: 7 (ref 4.6–8)
PROTEIN,URINE, POC: NEGATIVE
SPECIFIC GRAVITY, URINE, POC: 1.02 (ref 1–1.03)
URINALYSIS CLARITY, POC: CLEAR
URINALYSIS COLOR, POC: YELLOW
UROBILINOGEN, POC: ABNORMAL

## 2024-02-20 PROCEDURE — 99214 OFFICE O/P EST MOD 30 MIN: CPT | Performed by: UROLOGY

## 2024-02-20 PROCEDURE — 81003 URINALYSIS AUTO W/O SCOPE: CPT | Performed by: UROLOGY

## 2024-02-20 RX ORDER — TADALAFIL 20 MG/1
20 TABLET ORAL DAILY PRN
Qty: 30 TABLET | Refills: 0 | Status: SHIPPED | OUTPATIENT
Start: 2024-02-20

## 2024-02-20 RX ORDER — CELECOXIB 200 MG/1
200 CAPSULE ORAL DAILY
COMMUNITY
Start: 2024-01-30

## 2024-02-20 RX ORDER — TESTOSTERONE 20.25 MG/1.25G
2 GEL TOPICAL DAILY
Qty: 150 G | Refills: 0 | Status: SHIPPED | OUTPATIENT
Start: 2024-02-20

## 2024-02-20 RX ORDER — TADALAFIL 20 MG/1
20 TABLET ORAL PRN
Qty: 30 TABLET | Refills: 0 | Status: SHIPPED | OUTPATIENT
Start: 2024-02-20 | End: 2024-02-20 | Stop reason: SDUPTHER

## 2024-02-20 RX ORDER — TADALAFIL 20 MG/1
20 TABLET ORAL DAILY PRN
Qty: 30 TABLET | Refills: 0 | Status: SHIPPED | OUTPATIENT
Start: 2024-02-20 | End: 2024-02-20 | Stop reason: SDUPTHER

## 2024-02-20 RX ORDER — TESTOSTERONE 20.25 MG/1.25G
2 GEL TOPICAL DAILY
Qty: 150 G | Refills: 5 | Status: SHIPPED | OUTPATIENT
Start: 2024-02-20

## 2024-02-20 RX ORDER — TADALAFIL 20 MG/1
20 TABLET ORAL PRN
Qty: 60 TABLET | Refills: 3 | Status: SHIPPED | OUTPATIENT
Start: 2024-02-20 | End: 2024-02-20 | Stop reason: SDUPTHER

## 2024-02-20 ASSESSMENT — ENCOUNTER SYMPTOMS
APNEA: 1
DIARRHEA: 1

## 2024-02-20 NOTE — PROGRESS NOTES
Chief Complaint   Patient presents with    Follow-up     prostititis    Hypogonadism    Erectile Dysfunction        BP (!) 168/94   Pulse 71      PHQ-9 score is    Negative      1. \"Have you been to the ER, urgent care clinic since your last visit?  Hospitalized since your last visit?\" No    2. \"Have you seen or consulted any other health care providers outside of the [facility] since your last visit?\" No     3. For patients aged 45-75: Has the patient had a colonoscopy / FIT/ Cologuard? Yes - no Care Gap present      If the patient is female:    4. For patients aged 40-74: Has the patient had a mammogram within the past 2 years? NA - based on age or sex      5. For patients aged 21-65: Has the patient had a pap smear? NA - based on age or sex

## 2024-02-20 NOTE — PROGRESS NOTES
HISTORY OF PRESENT ILLNESS  Danny Judge is a 58 y.o. male   Patient came in he is under little more stress  Has been elevated in rank and with little more stress/sleep and erections are quite as good.  His free testosterone was still little low with a pump a day we will go to pump now for 2 pumps a day see him back in 3 to 6 months and see if he is doing better when he returns we will draw a PSA free and total testosterone in the office  1. Prostatitis, unspecified prostatitis type  Overview:  8/17/22 treated with cipro  has urgency,incomplete emptying( rates at 3) but is not voiding much.  He reports that if he is not on a fluid pill he voids 5-6 per day.       Orders:  -     AMB POC URINALYSIS DIP STICK AUTO W/O MICRO  2. Low testosterone in male  Overview:  He is on testosterone gel   2/1/2024; Hct=48.8           Component  Ref Range & Units 2/1/24 0923 7/7/23 0723 10/5/22 0834 8/9/22 0730   Testosterone  264 - 916 ng/dL 386 360   CM      Testosterone, Free Direct  6.6 - 18.1 pg/mL 4.0 Low  4.7 Low          Orders:  -     Testosterone 1.62 % GEL; Place 2 Pump onto the skin daily Max Daily Amount: 2 Pump, Disp-150 g, R-5Print  -     Testosterone 1.62 % GEL; Place 2 actuation onto the skin daily Max Daily Amount: 2 actuation, Disp-150 g, R-0Print  -     Testosterone 1.62 % GEL; Place 2 actuation onto the skin daily Max Daily Amount: 2 actuation, Disp-150 g, R-0Print  -     Testosterone 1.62 % GEL; Place 2 actuation onto the skin daily Max Daily Amount: 2 actuation, Disp-150 g, R-0Print  -     Testosterone 1.62 % GEL; Place 2 actuation onto the skin daily Max Daily Amount: 2 actuation, Disp-150 g, R-0Print  -     Testosterone 1.62 % GEL; Place 2 actuation onto the skin daily Max Daily Amount: 2 actuation, Disp-150 g, R-0Print  -     Testosterone 1.62 % GEL; Place 2 actuation onto the skin daily Max Daily Amount: 2 actuation, Disp-150 g, R-0Print  3. Benign prostatic hyperplasia with lower urinary

## 2024-03-01 DIAGNOSIS — E29.1 HYPOGONADISM IN MALE: Primary | ICD-10-CM

## 2024-03-06 RX ORDER — TESTOSTERONE 20.25 MG/1.25G
GEL TOPICAL
Qty: 75 G | Refills: 0 | Status: SHIPPED | OUTPATIENT
Start: 2024-03-06

## 2024-05-01 DIAGNOSIS — N41.9 INFLAMMATORY DISEASE OF PROSTATE, UNSPECIFIED: ICD-10-CM

## 2024-05-01 DIAGNOSIS — E29.1 HYPOGONADISM IN MALE: ICD-10-CM

## 2024-05-01 DIAGNOSIS — N52.01 ERECTILE DYSFUNCTION DUE TO ARTERIAL INSUFFICIENCY: ICD-10-CM

## 2024-05-01 DIAGNOSIS — R97.20 ELEVATED PSA: ICD-10-CM

## 2024-05-01 DIAGNOSIS — E29.1 TESTICULAR HYPOFUNCTION: ICD-10-CM

## 2024-08-04 ENCOUNTER — TELEPHONE (OUTPATIENT)
Age: 59
End: 2024-08-04

## 2024-08-12 ENCOUNTER — OFFICE VISIT (OUTPATIENT)
Age: 59
End: 2024-08-12
Payer: COMMERCIAL

## 2024-08-12 VITALS — SYSTOLIC BLOOD PRESSURE: 164 MMHG | HEART RATE: 62 BPM | DIASTOLIC BLOOD PRESSURE: 90 MMHG

## 2024-08-12 DIAGNOSIS — N52.9 ERECTILE DYSFUNCTION, UNSPECIFIED ERECTILE DYSFUNCTION TYPE: Primary | ICD-10-CM

## 2024-08-12 DIAGNOSIS — E29.1 HYPOGONADISM IN MALE: ICD-10-CM

## 2024-08-12 DIAGNOSIS — Z79.890 ENCOUNTER FOR MONITORING TESTOSTERONE REPLACEMENT THERAPY: ICD-10-CM

## 2024-08-12 DIAGNOSIS — R79.89 LOW TESTOSTERONE IN MALE: ICD-10-CM

## 2024-08-12 DIAGNOSIS — Z51.81 ENCOUNTER FOR MONITORING TESTOSTERONE REPLACEMENT THERAPY: ICD-10-CM

## 2024-08-12 DIAGNOSIS — N40.1 BENIGN PROSTATIC HYPERPLASIA WITH LOWER URINARY TRACT SYMPTOMS, SYMPTOM DETAILS UNSPECIFIED: ICD-10-CM

## 2024-08-12 PROCEDURE — 99214 OFFICE O/P EST MOD 30 MIN: CPT | Performed by: UROLOGY

## 2024-08-12 RX ORDER — TESTOSTERONE 20.25 MG/1.25G
2 GEL TOPICAL DAILY
Qty: 150 G | Refills: 0 | Status: SHIPPED | OUTPATIENT
Start: 2024-08-12

## 2024-08-12 RX ORDER — TESTOSTERONE 20.25 MG/1.25G
2 GEL TOPICAL DAILY
Qty: 150 G | Refills: 5 | Status: SHIPPED | OUTPATIENT
Start: 2024-08-12

## 2024-08-12 RX ORDER — TESTOSTERONE 20.25 MG/1.25G
2 GEL TOPICAL DAILY
Qty: 150 G | Refills: 5 | Status: SHIPPED | OUTPATIENT
Start: 2024-08-12 | End: 2024-08-12 | Stop reason: SDUPTHER

## 2024-08-12 RX ORDER — SILDENAFIL 100 MG/1
100 TABLET, FILM COATED ORAL DAILY PRN
Qty: 30 TABLET | Refills: 5 | Status: SHIPPED | OUTPATIENT
Start: 2024-08-12

## 2024-08-12 NOTE — PROGRESS NOTES
Chief Complaint   Patient presents with    Follow-up        BP (!) 164/90   Pulse 62      PHQ-9 score is    Negative      1. \"Have you been to the ER, urgent care clinic since your last visit?  Hospitalized since your last visit?\" No    2. \"Have you seen or consulted any other health care providers outside of the [facility] since your last visit?\" No     3. For patients aged 45-75: Has the patient had a colonoscopy / FIT/ Cologuard? Yes - no Care Gap present      If the patient is female:    4. For patients aged 40-74: Has the patient had a mammogram within the past 2 years? NA - based on age or sex      5. For patients aged 21-65: Has the patient had a pap smear? NA - based on age or sex

## 2024-08-12 NOTE — PROGRESS NOTES
HISTORY OF PRESENT ILLNESS  Danny Judge is a 59 y.o. male   Patient has low testosterone.  Needs multiple prescription for his testosterone for refills.  It appears to be working nicely for him.  Cialis may not be strong enough he wants to try Viagra instead for perhaps a stronger urge.  1. Erectile dysfunction, unspecified erectile dysfunction type  Overview:  On Cialis  Orders:  -     Testosterone 1.62 % GEL; Place 2 Pump onto the skin daily Max Daily Amount: 2 Pump, Disp-150 g, R-5Normal  -     sildenafil (VIAGRA) 100 MG tablet; Take 1 tablet by mouth daily as needed for Erectile Dysfunction, Disp-30 tablet, R-5Normal  2. Benign prostatic hyperplasia with lower urinary tract symptoms, symptom details unspecified  Overview:  Component  Ref Range & Units 2/1/24 0923 7/7/23 0723 8/9/22 0730   PSA  0.0 - 4.0 ng/mL 0.6 0.6 CM 0.4 CM     3. Hypogonadism in male  Overview:  On TRT gel application    Labs  from 8/9/22  Testosterone 388  Free testosterone 6.3    PSA=0.4  Labs from 5/2022  Testosterone total=210  PSA=0.4  - he is had loss of energy and weight gain.  He does have occasional   urgency to get the bathroom in time.        4. Encounter for monitoring testosterone replacement therapy  -     PSA, Total and Free  -     CBC  -     Testosterone, free, total  5. Low testosterone in male  Overview:  He is on testosterone gel   2/1/2024; Hct=48.8           Component  Ref Range & Units 2/1/24 0923 7/7/23 0723 10/5/22 0834 8/9/22 0730   Testosterone  264 - 916 ng/dL 386 360   CM      Testosterone, Free Direct  6.6 - 18.1 pg/mL 4.0 Low  4.7 Low          Orders:  -     Testosterone 1.62 % GEL; Place 2 Pump onto the skin daily Max Daily Amount: 2 Pump, Disp-150 g, R-5Normal        PAST MEDICAL HISTORY  PMHx (including negatives):  has a past medical history of Cancer (HCC), Hypertension, Morbid obesity (HCC), Other ill-defined conditions(799.89), Unspecified adverse effect of anesthesia, and Unspecified sleep

## 2024-08-13 LAB
ERYTHROCYTE [DISTWIDTH] IN BLOOD BY AUTOMATED COUNT: 12.7 % (ref 11.6–15.4)
HCT VFR BLD AUTO: 46.4 % (ref 37.5–51)
HGB BLD-MCNC: 15.8 G/DL (ref 13–17.7)
MCH RBC QN AUTO: 30.5 PG (ref 26.6–33)
MCHC RBC AUTO-ENTMCNC: 34.1 G/DL (ref 31.5–35.7)
MCV RBC AUTO: 90 FL (ref 79–97)
PLATELET # BLD AUTO: 181 X10E3/UL (ref 150–450)
PSA FREE MFR SERPL: 26.7 %
PSA FREE SERPL-MCNC: 0.16 NG/ML
PSA SERPL-MCNC: 0.6 NG/ML (ref 0–4)
RBC # BLD AUTO: 5.18 X10E6/UL (ref 4.14–5.8)
WBC # BLD AUTO: 7.4 X10E3/UL (ref 3.4–10.8)

## 2024-08-14 LAB
TESTOST FREE SERPL-MCNC: 12.3 PG/ML (ref 6.6–18.1)
TESTOST SERPL-MCNC: 793 NG/DL (ref 264–916)

## 2025-02-10 DIAGNOSIS — E29.1 HYPOGONADISM IN MALE: Primary | ICD-10-CM

## 2025-02-13 ENCOUNTER — TELEPHONE (OUTPATIENT)
Age: 60
End: 2025-02-13

## 2025-02-17 DIAGNOSIS — E29.1 HYPOGONADISM IN MALE: ICD-10-CM

## 2025-02-19 ENCOUNTER — TELEPHONE (OUTPATIENT)
Age: 60
End: 2025-02-19

## 2025-02-19 NOTE — TELEPHONE ENCOUNTER
Spoke to patient about blood work. He has problem with lab candace. Lab work usually done in the office, ok with DR. Roberto

## 2025-02-24 ENCOUNTER — OFFICE VISIT (OUTPATIENT)
Age: 60
End: 2025-02-24
Payer: COMMERCIAL

## 2025-02-24 VITALS — DIASTOLIC BLOOD PRESSURE: 75 MMHG | HEART RATE: 63 BPM | SYSTOLIC BLOOD PRESSURE: 157 MMHG

## 2025-02-24 DIAGNOSIS — Z51.81 ENCOUNTER FOR MONITORING TESTOSTERONE REPLACEMENT THERAPY: ICD-10-CM

## 2025-02-24 DIAGNOSIS — N40.1 BENIGN PROSTATIC HYPERPLASIA WITH LOWER URINARY TRACT SYMPTOMS, SYMPTOM DETAILS UNSPECIFIED: ICD-10-CM

## 2025-02-24 DIAGNOSIS — R53.82 CHRONIC FATIGUE: ICD-10-CM

## 2025-02-24 DIAGNOSIS — Z79.890 ENCOUNTER FOR MONITORING TESTOSTERONE REPLACEMENT THERAPY: ICD-10-CM

## 2025-02-24 DIAGNOSIS — R79.89 LOW TESTOSTERONE IN MALE: ICD-10-CM

## 2025-02-24 DIAGNOSIS — E29.1 HYPOGONADISM IN MALE: Primary | ICD-10-CM

## 2025-02-24 DIAGNOSIS — N52.9 ERECTILE DYSFUNCTION, UNSPECIFIED ERECTILE DYSFUNCTION TYPE: ICD-10-CM

## 2025-02-24 PROCEDURE — 81003 URINALYSIS AUTO W/O SCOPE: CPT | Performed by: UROLOGY

## 2025-02-24 PROCEDURE — 99214 OFFICE O/P EST MOD 30 MIN: CPT | Performed by: UROLOGY

## 2025-02-24 RX ORDER — TESTOSTERONE 20.25 MG/1.25G
2 GEL TOPICAL DAILY
Qty: 150 G | Refills: 5 | Status: SHIPPED | OUTPATIENT
Start: 2025-02-24

## 2025-02-24 NOTE — PROGRESS NOTES
HISTORY OF PRESENT ILLNESS  Danny Judge is a 59 y.o. male   Patient has a history of low testosterone.  The testosterone cream appears to be helping him with his fatigue and with his erection issues.  Patient states testosterone has been a real help  1. Hypogonadism in male  Overview:  On TRT gel application  - he is had loss of energy and weight gain.  He does have occasional   urgency to get the bathroom in time.        Orders:  -     AMB POC URINALYSIS DIP STICK AUTO W/O MICRO  -     Testosterone, free, total  -     PSA, Total and Free  -     CBC  -     Testosterone 1.62 % GEL; Place 2 actuation onto the skin daily Max Daily Amount: 2 actuation, Disp-150 g, R-5Normal  2. Benign prostatic hyperplasia with lower urinary tract symptoms, symptom details unspecified  Overview:  Component  Ref Range & Units 2/1/24 0923 7/7/23 0723 8/9/22 0730   PSA  0.0 - 4.0 ng/mL 0.6 0.6 CM 0.4 CM     Patient has problems with lab candace and prefers to do blood work in the office    3. Erectile dysfunction, unspecified erectile dysfunction type  Overview:  On Cialis,TRT  Orders:  -     Testosterone 1.62 % GEL; Place 2 actuation onto the skin daily Max Daily Amount: 2 actuation, Disp-150 g, R-5Normal  4. Encounter for monitoring testosterone replacement therapy  -     AMB POC URINALYSIS DIP STICK AUTO W/O MICRO  -     Testosterone, free, total  -     PSA, Total and Free  -     CBC  5. Low testosterone in male  Overview:  He is on testosterone gel   2/1/2024; Hct=48.8           Component  Ref Range & Units 2/1/24 0923 7/7/23 0723 10/5/22 0834 8/9/22 0730   Testosterone  264 - 916 ng/dL 386 360   CM      Testosterone, Free Direct  6.6 - 18.1 pg/mL 4.0 Low  4.7 Low          Orders:  -     Testosterone 1.62 % GEL; Place 2 actuation onto the skin daily Max Daily Amount: 2 actuation, Disp-150 g, R-5Normal  6. Chronic fatigue  -     Testosterone 1.62 % GEL; Place 2 actuation onto the skin daily Max Daily Amount: 2 actuation,

## 2025-02-24 NOTE — PROGRESS NOTES
Chief Complaint   Patient presents with    Follow-up     bph     BP (!) 157/75 (Site: Left Upper Arm, Position: Sitting, Cuff Size: Large Adult)   Pulse 63     1. Have you been to the ER, urgent care clinic since your last visit?  Hospitalized since your last visit? Patient first upper respiratory infection this winter on [place]    2. Have you seen or consulted any other health care providers outside of the [facility] since your last visit?  Include any pap smears or colon screening. No

## 2025-02-25 LAB
BILIRUBIN, URINE, POC: NEGATIVE
BLOOD URINE, POC: NEGATIVE
GLUCOSE URINE, POC: NEGATIVE
KETONES, URINE, POC: NEGATIVE
LEUKOCYTE ESTERASE, URINE, POC: NEGATIVE
NITRITE, URINE, POC: NEGATIVE
PH, URINE, POC: 6 (ref 4.6–8)
PROTEIN,URINE, POC: NEGATIVE
SPECIFIC GRAVITY, URINE, POC: 1.02 (ref 1–1.03)
URINALYSIS CLARITY, POC: CLEAR
URINALYSIS COLOR, POC: YELLOW
UROBILINOGEN, POC: NORMAL

## 2025-03-01 LAB
ERYTHROCYTE [DISTWIDTH] IN BLOOD BY AUTOMATED COUNT: 12.5 % (ref 11.6–15.4)
HCT VFR BLD AUTO: 51.4 % (ref 37.5–51)
HGB BLD-MCNC: 16.8 G/DL (ref 13–17.7)
MCH RBC QN AUTO: 30.5 PG (ref 26.6–33)
MCHC RBC AUTO-ENTMCNC: 32.7 G/DL (ref 31.5–35.7)
MCV RBC AUTO: 94 FL (ref 79–97)
PLATELET # BLD AUTO: 197 X10E3/UL (ref 150–450)
PSA FREE MFR SERPL: 22 %
PSA FREE SERPL-MCNC: 0.22 NG/ML
PSA SERPL-MCNC: 1 NG/ML (ref 0–4)
RBC # BLD AUTO: 5.5 X10E6/UL (ref 4.14–5.8)
WBC # BLD AUTO: 6.8 X10E3/UL (ref 3.4–10.8)

## 2025-03-05 LAB
TESTOST FREE SERPL-MCNC: 9.6 PG/ML (ref 6.6–18.1)
TESTOST SERPL-MCNC: 660 NG/DL (ref 264–916)

## 2025-03-18 ENCOUNTER — TELEPHONE (OUTPATIENT)
Age: 60
End: 2025-03-18

## 2025-03-19 DIAGNOSIS — N52.9 ERECTILE DYSFUNCTION, UNSPECIFIED ERECTILE DYSFUNCTION TYPE: ICD-10-CM

## 2025-03-19 RX ORDER — SILDENAFIL 100 MG/1
100 TABLET, FILM COATED ORAL DAILY PRN
Qty: 30 TABLET | Refills: 1 | Status: SHIPPED | OUTPATIENT
Start: 2025-03-19

## 2025-04-02 DIAGNOSIS — R79.89 LOW TESTOSTERONE IN MALE: ICD-10-CM

## 2025-04-02 RX ORDER — TESTOSTERONE 20.25 MG/1.25G
2 GEL TOPICAL DAILY
Qty: 150 G | Refills: 4 | Status: SHIPPED | OUTPATIENT
Start: 2025-04-02

## 2025-08-29 DIAGNOSIS — E29.1 HYPOGONADISM IN MALE: Primary | ICD-10-CM

## 2025-08-30 DIAGNOSIS — E29.1 HYPOGONADISM IN MALE: ICD-10-CM
